# Patient Record
Sex: FEMALE | Race: WHITE | HISPANIC OR LATINO
[De-identification: names, ages, dates, MRNs, and addresses within clinical notes are randomized per-mention and may not be internally consistent; named-entity substitution may affect disease eponyms.]

---

## 2020-09-02 PROBLEM — Z00.00 ENCOUNTER FOR PREVENTIVE HEALTH EXAMINATION: Status: ACTIVE | Noted: 2020-09-02

## 2020-09-24 ENCOUNTER — APPOINTMENT (OUTPATIENT)
Dept: NEPHROLOGY | Facility: CLINIC | Age: 35
End: 2020-09-24
Payer: SELF-PAY

## 2020-09-24 VITALS
OXYGEN SATURATION: 99 % | BODY MASS INDEX: 30.48 KG/M2 | TEMPERATURE: 99.5 F | DIASTOLIC BLOOD PRESSURE: 72 MMHG | HEIGHT: 63 IN | WEIGHT: 172 LBS | HEART RATE: 70 BPM | SYSTOLIC BLOOD PRESSURE: 107 MMHG

## 2020-09-24 DIAGNOSIS — K29.70 GASTRITIS, UNSPECIFIED, W/OUT BLEEDING: ICD-10-CM

## 2020-09-24 DIAGNOSIS — Z87.42 PERSONAL HISTORY OF OTHER DISEASES OF THE FEMALE GENITAL TRACT: ICD-10-CM

## 2020-09-24 DIAGNOSIS — Z78.9 OTHER SPECIFIED HEALTH STATUS: ICD-10-CM

## 2020-09-24 PROCEDURE — 36415 COLL VENOUS BLD VENIPUNCTURE: CPT

## 2020-09-24 PROCEDURE — 99205 OFFICE O/P NEW HI 60 MIN: CPT | Mod: 25

## 2020-09-24 NOTE — HISTORY OF PRESENT ILLNESS
[FreeTextEntry1] : Patient is a 35 year old female with history of gastritis; abnormal menstral bleeding; vitamin D def; here for initial evaluation of proteinuria. Pt has been on omeprazole for 4 months; gabapentin for LE neuropathy. Last month, found to have proteinuria in urine. No other complaints at this time.

## 2020-09-24 NOTE — PHYSICAL EXAM
[General Appearance - Alert] : alert [General Appearance - In No Acute Distress] : in no acute distress [General Appearance - Well Nourished] : well nourished [General Appearance - Well Developed] : well developed [Sclera] : the sclera and conjunctiva were normal [Outer Ear] : the ears and nose were normal in appearance [Neck Appearance] : the appearance of the neck was normal [Respiration, Rhythm And Depth] : normal respiratory rhythm and effort [Auscultation Breath Sounds / Voice Sounds] : lungs were clear to auscultation bilaterally [Heart Rate And Rhythm] : heart rate was normal and rhythm regular [Heart Sounds] : normal S1 and S2 [Arterial Pulses Carotid] : carotid pulses were normal with no bruits [Bowel Sounds] : normal bowel sounds [Abdomen Soft] : soft [Abdomen Tenderness] : non-tender [Cervical Lymph Nodes Enlarged Posterior Bilaterally] : posterior cervical [No CVA Tenderness] : no ~M costovertebral angle tenderness [Abnormal Walk] : normal gait [Skin Color & Pigmentation] : normal skin color and pigmentation [Skin Turgor] : normal skin turgor [] : no rash [Cranial Nerves] : cranial nerves 2-12 were intact [Oriented To Time, Place, And Person] : oriented to person, place, and time [Impaired Insight] : insight and judgment were intact [Affect] : the affect was normal

## 2020-09-24 NOTE — REVIEW OF SYSTEMS
[Fever] : no fever [Chills] : no chills [Eye Pain] : no eye pain [Red Eyes] : eyes not red [Earache] : no earache [Loss Of Hearing] : no hearing loss [Heart Rate Is Slow] : the heart rate was not slow [Heart Rate Is Fast] : the heart rate was not fast [Abdominal Pain] : no abdominal pain [Vomiting] : no vomiting [Arthralgias] : no arthralgias [Joint Pain] : no joint pain [Skin Lesions] : no skin lesions [Skin Wound] : no skin wound [Proptosis] : no proptosis [Hot Flashes] : no hot flashes [Easy Bleeding] : no tendency for easy bleeding [Easy Bruising] : no tendency for easy bruising [Negative] : Heme/Lymph

## 2020-09-24 NOTE — ASSESSMENT
[FreeTextEntry1] : 1) Proteinuria\par 2) Gastritis\par 3) Neuropathy\par 4) Vitamin D Def\par \par Will stop omeprazole;\par Start famotidine;\par CHeck baseline labs\par CBC, renal panel, UA, urine pro/cr ratio;\par Serum free light chains\par Immunofixation\par US renal\par \par FU 1 mo and repeat spot ratio OFF omeprazole

## 2020-09-29 LAB
ALBUMIN SERPL ELPH-MCNC: 4.3 G/DL
ANION GAP SERPL CALC-SCNC: 11 MMOL/L
APPEARANCE: CLEAR
BACTERIA: ABNORMAL
BASOPHILS # BLD AUTO: 0.07 K/UL
BASOPHILS NFR BLD AUTO: 1.3 %
BILIRUBIN URINE: NEGATIVE
BLOOD URINE: ABNORMAL
BUN SERPL-MCNC: 16 MG/DL
CALCIUM OXALATE CRYSTALS: ABNORMAL
CALCIUM SERPL-MCNC: 9.4 MG/DL
CHLORIDE SERPL-SCNC: 104 MMOL/L
CO2 SERPL-SCNC: 26 MMOL/L
COLOR: NORMAL
CREAT SERPL-MCNC: 0.89 MG/DL
CREAT SPEC-SCNC: 166 MG/DL
CREAT/PROT UR: 0 RATIO
DEPRECATED KAPPA LC FREE/LAMBDA SER: 1.07 RATIO
EOSINOPHIL # BLD AUTO: 0.18 K/UL
EOSINOPHIL NFR BLD AUTO: 3.3 %
ESTIMATED AVERAGE GLUCOSE: 117 MG/DL
GLUCOSE QUALITATIVE U: NEGATIVE
GLUCOSE SERPL-MCNC: 84 MG/DL
HBA1C MFR BLD HPLC: 5.7 %
HCT VFR BLD CALC: 40.4 %
HGB BLD-MCNC: 12.7 G/DL
HYALINE CASTS: 0 /LPF
IMM GRANULOCYTES NFR BLD AUTO: 0.2 %
KAPPA LC CSF-MCNC: 2.11 MG/DL
KAPPA LC SERPL-MCNC: 2.25 MG/DL
KETONES URINE: NEGATIVE
LEUKOCYTE ESTERASE URINE: NEGATIVE
LYMPHOCYTES # BLD AUTO: 2.37 K/UL
LYMPHOCYTES NFR BLD AUTO: 43 %
M PROTEIN SPEC IFE-MCNC: NORMAL
MAN DIFF?: NORMAL
MCHC RBC-ENTMCNC: 30 PG
MCHC RBC-ENTMCNC: 31.4 GM/DL
MCV RBC AUTO: 95.5 FL
MICROSCOPIC-UA: NORMAL
MONOCYTES # BLD AUTO: 0.57 K/UL
MONOCYTES NFR BLD AUTO: 10.3 %
NEUTROPHILS # BLD AUTO: 2.31 K/UL
NEUTROPHILS NFR BLD AUTO: 41.9 %
NITRITE URINE: NEGATIVE
PH URINE: 6
PHOSPHATE SERPL-MCNC: 3.4 MG/DL
PLATELET # BLD AUTO: 190 K/UL
POTASSIUM SERPL-SCNC: 4.9 MMOL/L
PROT UR-MCNC: 6 MG/DL
PROTEIN URINE: NORMAL
RBC # BLD: 4.23 M/UL
RBC # FLD: 13.8 %
RED BLOOD CELLS URINE: 2 /HPF
SODIUM SERPL-SCNC: 140 MMOL/L
SPECIFIC GRAVITY URINE: 1.03
SQUAMOUS EPITHELIAL CELLS: 6 /HPF
UROBILINOGEN URINE: NORMAL
WBC # FLD AUTO: 5.51 K/UL
WHITE BLOOD CELLS URINE: 2 /HPF

## 2020-10-27 ENCOUNTER — OUTPATIENT (OUTPATIENT)
Dept: OUTPATIENT SERVICES | Facility: HOSPITAL | Age: 35
LOS: 1 days | End: 2020-10-27
Payer: SELF-PAY

## 2020-10-27 ENCOUNTER — APPOINTMENT (OUTPATIENT)
Dept: ULTRASOUND IMAGING | Facility: CLINIC | Age: 35
End: 2020-10-27
Payer: SELF-PAY

## 2020-10-27 DIAGNOSIS — K29.70 GASTRITIS, UNSPECIFIED, WITHOUT BLEEDING: ICD-10-CM

## 2020-10-27 PROCEDURE — 76775 US EXAM ABDO BACK WALL LIM: CPT | Mod: 26

## 2020-10-27 PROCEDURE — 76770 US EXAM ABDO BACK WALL COMP: CPT | Mod: 26

## 2020-10-27 PROCEDURE — 76770 US EXAM ABDO BACK WALL COMP: CPT

## 2020-10-27 PROCEDURE — 76775 US EXAM ABDO BACK WALL LIM: CPT

## 2020-10-29 ENCOUNTER — APPOINTMENT (OUTPATIENT)
Dept: NEPHROLOGY | Facility: CLINIC | Age: 35
End: 2020-10-29

## 2020-11-19 ENCOUNTER — APPOINTMENT (OUTPATIENT)
Dept: NEPHROLOGY | Facility: CLINIC | Age: 35
End: 2020-11-19
Payer: SELF-PAY

## 2020-11-19 VITALS
HEART RATE: 67 BPM | SYSTOLIC BLOOD PRESSURE: 109 MMHG | DIASTOLIC BLOOD PRESSURE: 75 MMHG | WEIGHT: 172 LBS | BODY MASS INDEX: 30.48 KG/M2 | TEMPERATURE: 98.4 F | HEIGHT: 63 IN | OXYGEN SATURATION: 99 %

## 2020-11-19 PROCEDURE — 99215 OFFICE O/P EST HI 40 MIN: CPT

## 2020-11-19 NOTE — HISTORY OF PRESENT ILLNESS
[FreeTextEntry1] : Patient is a 35 year old female with history of gastritis; abnormal menstral bleeding; vitamin D def; here for initial evaluation of proteinuria. Pt has been on omeprazole for 4 months; gabapentin for LE neuropathy. Last month, found to have proteinuria in urine. No other complaints at this time.\par \par Current: Pt seen/examined; no complaints; urine pro/cr ratio shows no proteinuria; UA showed trace.

## 2020-11-19 NOTE — ASSESSMENT
[FreeTextEntry1] : 1) Proteinuria\par 2) Gastritis\par 3) Neuropathy\par 4) Vitamin D Def\par \par Off omeprazole; now on famotidine\par UA showed trace proteinuria\par Urine pro/cr ratio showed zero\par Immunofixation negative\par RTC 1 year

## 2020-12-16 ENCOUNTER — ASOB RESULT (OUTPATIENT)
Age: 35
End: 2020-12-16

## 2020-12-16 ENCOUNTER — APPOINTMENT (OUTPATIENT)
Dept: ANTEPARTUM | Facility: CLINIC | Age: 35
End: 2020-12-16
Payer: SELF-PAY

## 2020-12-16 PROCEDURE — 76856 US EXAM PELVIC COMPLETE: CPT | Mod: 59

## 2020-12-16 PROCEDURE — 76830 TRANSVAGINAL US NON-OB: CPT | Mod: 59

## 2021-05-11 ENCOUNTER — APPOINTMENT (OUTPATIENT)
Dept: MATERNAL FETAL MEDICINE | Facility: CLINIC | Age: 36
End: 2021-05-11
Payer: MEDICAID

## 2021-05-11 ENCOUNTER — ASOB RESULT (OUTPATIENT)
Age: 36
End: 2021-05-11

## 2021-05-11 PROCEDURE — 99202 OFFICE O/P NEW SF 15 MIN: CPT | Mod: 95

## 2021-05-20 ENCOUNTER — APPOINTMENT (OUTPATIENT)
Dept: MATERNAL FETAL MEDICINE | Facility: CLINIC | Age: 36
End: 2021-05-20

## 2021-05-20 ENCOUNTER — APPOINTMENT (OUTPATIENT)
Dept: ANTEPARTUM | Facility: CLINIC | Age: 36
End: 2021-05-20
Payer: MEDICAID

## 2021-05-20 ENCOUNTER — APPOINTMENT (OUTPATIENT)
Dept: MATERNAL FETAL MEDICINE | Facility: CLINIC | Age: 36
End: 2021-05-20
Payer: MEDICAID

## 2021-05-20 ENCOUNTER — ASOB RESULT (OUTPATIENT)
Age: 36
End: 2021-05-20

## 2021-05-20 PROCEDURE — 76801 OB US < 14 WKS SINGLE FETUS: CPT

## 2021-05-20 PROCEDURE — 99204 OFFICE O/P NEW MOD 45 MIN: CPT

## 2021-06-03 ENCOUNTER — TRANSCRIPTION ENCOUNTER (OUTPATIENT)
Age: 36
End: 2021-06-03

## 2021-06-14 ENCOUNTER — APPOINTMENT (OUTPATIENT)
Dept: ANTEPARTUM | Facility: CLINIC | Age: 36
End: 2021-06-14
Payer: MEDICAID

## 2021-06-14 ENCOUNTER — ASOB RESULT (OUTPATIENT)
Age: 36
End: 2021-06-14

## 2021-06-14 PROCEDURE — 76813 OB US NUCHAL MEAS 1 GEST: CPT

## 2021-06-17 LAB
1ST TRIMESTER DATA: NORMAL
ADDENDUM DOC: NORMAL
AFP PNL SERPL: NORMAL
AFP SERPL-ACNC: NORMAL
CLINICAL BIOCHEMIST REVIEW: NORMAL
FREE BETA HCG 1ST TRIMESTER: NORMAL
Lab: NORMAL
NASAL BONE: PRESENT
NOTES NTD: NORMAL
NT: NORMAL
PAPP-A SERPL-ACNC: NORMAL
TRISOMY 18/3: NORMAL

## 2021-07-19 ENCOUNTER — APPOINTMENT (OUTPATIENT)
Dept: ANTEPARTUM | Facility: CLINIC | Age: 36
End: 2021-07-19
Payer: MEDICAID

## 2021-07-19 VITALS — WEIGHT: 178 LBS | BODY MASS INDEX: 31.53 KG/M2

## 2021-07-19 PROCEDURE — 36415 COLL VENOUS BLD VENIPUNCTURE: CPT

## 2021-08-16 ENCOUNTER — APPOINTMENT (OUTPATIENT)
Dept: ANTEPARTUM | Facility: CLINIC | Age: 36
End: 2021-08-16
Payer: MEDICAID

## 2021-08-16 ENCOUNTER — ASOB RESULT (OUTPATIENT)
Age: 36
End: 2021-08-16

## 2021-08-16 DIAGNOSIS — Z78.9 OTHER SPECIFIED HEALTH STATUS: ICD-10-CM

## 2021-08-16 PROCEDURE — 76811 OB US DETAILED SNGL FETUS: CPT

## 2021-08-16 PROCEDURE — 76817 TRANSVAGINAL US OBSTETRIC: CPT

## 2021-09-08 LAB
1ST TRIMESTER DATA: NORMAL
2ND TRIMESTER DATA: NORMAL
AFP PNL SERPL: NORMAL
AFP SERPL-ACNC: NORMAL
AFP SERPL-ACNC: NORMAL
B-HCG FREE SERPL-MCNC: NORMAL
CLINICAL BIOCHEMIST REVIEW: NORMAL
FREE BETA HCG 1ST TRIMESTER: NORMAL
INHIBIN A SERPL-MCNC: NORMAL
NASAL BONE: PRESENT
NOTES NTD: NORMAL
NT: NORMAL
PAPP-A SERPL-ACNC: NORMAL
U ESTRIOL SERPL-SCNC: NORMAL

## 2021-09-10 ENCOUNTER — APPOINTMENT (OUTPATIENT)
Dept: PEDIATRIC CARDIOLOGY | Facility: CLINIC | Age: 36
End: 2021-09-10
Payer: MEDICAID

## 2021-09-10 DIAGNOSIS — O09.812 SUPERVISION OF PREGNANCY RESULTING FROM ASSISTED REPRODUCTIVE TECHNOLOGY, SECOND TRIMESTER: ICD-10-CM

## 2021-09-10 DIAGNOSIS — Z3A.24 24 WEEKS GESTATION OF PREGNANCY: ICD-10-CM

## 2021-09-10 DIAGNOSIS — O09.522 SUPERVISION OF ELDERLY MULTIGRAVIDA, SECOND TRIMESTER: ICD-10-CM

## 2021-09-10 DIAGNOSIS — O35.9XX0 MATERNAL CARE FOR (SUSPECTED) FETAL ABNORMALITY AND DAMAGE, UNSPECIFIED, NOT APPLICABLE OR UNSPECIFIED: ICD-10-CM

## 2021-09-10 PROCEDURE — 76825 ECHO EXAM OF FETAL HEART: CPT

## 2021-09-10 PROCEDURE — 76820 UMBILICAL ARTERY ECHO: CPT

## 2021-09-10 PROCEDURE — 99204 OFFICE O/P NEW MOD 45 MIN: CPT

## 2021-09-10 PROCEDURE — 93325 DOPPLER ECHO COLOR FLOW MAPG: CPT | Mod: 59

## 2021-09-10 PROCEDURE — 76821 MIDDLE CEREBRAL ARTERY ECHO: CPT

## 2021-09-10 PROCEDURE — 76827 ECHO EXAM OF FETAL HEART: CPT

## 2021-10-05 ENCOUNTER — EMERGENCY (EMERGENCY)
Facility: HOSPITAL | Age: 36
LOS: 1 days | Discharge: HOME | End: 2021-10-05
Attending: EMERGENCY MEDICINE
Payer: COMMERCIAL

## 2021-10-05 VITALS
DIASTOLIC BLOOD PRESSURE: 77 MMHG | HEART RATE: 99 BPM | RESPIRATION RATE: 19 BRPM | OXYGEN SATURATION: 98 % | SYSTOLIC BLOOD PRESSURE: 125 MMHG | TEMPERATURE: 98 F | WEIGHT: 182.1 LBS

## 2021-10-05 LAB
RAPID RVP RESULT: SIGNIFICANT CHANGE UP
SARS-COV-2 RNA SPEC QL NAA+PROBE: SIGNIFICANT CHANGE UP

## 2021-10-05 PROCEDURE — 99283 EMERGENCY DEPT VISIT LOW MDM: CPT

## 2021-10-05 PROCEDURE — 0225U NFCT DS DNA&RNA 21 SARSCOV2: CPT

## 2021-10-05 PROCEDURE — 99284 EMERGENCY DEPT VISIT MOD MDM: CPT

## 2021-10-05 RX ORDER — ACETAMINOPHEN 500 MG
650 TABLET ORAL ONCE
Refills: 0 | Status: COMPLETED | OUTPATIENT
Start: 2021-10-05 | End: 2021-10-05

## 2021-10-05 RX ADMIN — Medication 650 MILLIGRAM(S): at 05:36

## 2021-10-05 NOTE — ED ADULT TRIAGE NOTE - CHIEF COMPLAINT QUOTE
Patient 28 weeks pregnant came in with complain of dry cough started few days ago. Patient denies vaginal bleeding fever chills and congestion.

## 2021-10-05 NOTE — CHART NOTE - NSCHARTNOTEFT_GEN_A_CORE
Pt presents to ED for evaluation of a cough. Scheduled for covid vaccine on 10/15.  No obstetrical complaints. + fetal movement, - loss of fluid, - vaginal bleeding, - ctx.     NST currently being performed. Will review after 20 minutes.

## 2021-10-05 NOTE — ED PROVIDER NOTE - PATIENT PORTAL LINK FT
You can access the FollowMyHealth Patient Portal offered by [facility] by registering at the following website: [email or web address]. By joining [employer]’s FollowMyHealth portal, you will also be able to view your health information using other applications (apps) compatible with our system.

## 2021-10-05 NOTE — ED PROVIDER NOTE - OBJECTIVE STATEMENT
pt is a 37 y/o female presenting to the ed for evaluation of cough. pt admits to dry cough the past two days, states that it has been persistent. pt is 28 weeks pregnant , pt with no complications in pregnancy, pt denies vaginal bleeding or abdominal pain. pt denies cp sob sore throat ear pain fever abd pain diarrhea back pain calf pain or leg swelling

## 2021-10-05 NOTE — ED PROVIDER NOTE - ATTENDING CONTRIBUTION TO CARE
Patient is 28 weeks pregnant p/w dry cough x 2 days. Denies fever, sob, cp. VSS, well appearing. Plan for tylenol, RVP/covid swab, ob consult for NST.

## 2021-10-11 ENCOUNTER — APPOINTMENT (OUTPATIENT)
Dept: ANTEPARTUM | Facility: CLINIC | Age: 36
End: 2021-10-11

## 2021-10-21 ENCOUNTER — ASOB RESULT (OUTPATIENT)
Age: 36
End: 2021-10-21

## 2021-10-21 ENCOUNTER — APPOINTMENT (OUTPATIENT)
Dept: ANTEPARTUM | Facility: CLINIC | Age: 36
End: 2021-10-21
Payer: MEDICAID

## 2021-10-21 PROCEDURE — 76816 OB US FOLLOW-UP PER FETUS: CPT

## 2021-11-18 ENCOUNTER — APPOINTMENT (OUTPATIENT)
Dept: ANTEPARTUM | Facility: CLINIC | Age: 36
End: 2021-11-18
Payer: MEDICAID

## 2021-11-18 ENCOUNTER — ASOB RESULT (OUTPATIENT)
Age: 36
End: 2021-11-18

## 2021-11-18 PROCEDURE — 76816 OB US FOLLOW-UP PER FETUS: CPT

## 2021-12-02 ENCOUNTER — APPOINTMENT (OUTPATIENT)
Dept: NEPHROLOGY | Facility: CLINIC | Age: 36
End: 2021-12-02

## 2021-12-12 ENCOUNTER — INPATIENT (INPATIENT)
Facility: HOSPITAL | Age: 36
LOS: 3 days | Discharge: HOME | End: 2021-12-16
Attending: OBSTETRICS & GYNECOLOGY | Admitting: OBSTETRICS & GYNECOLOGY
Payer: COMMERCIAL

## 2021-12-12 VITALS — DIASTOLIC BLOOD PRESSURE: 70 MMHG | SYSTOLIC BLOOD PRESSURE: 144 MMHG | RESPIRATION RATE: 18 BRPM | HEART RATE: 54 BPM

## 2021-12-12 DIAGNOSIS — O47.1 FALSE LABOR AT OR AFTER 37 COMPLETED WEEKS OF GESTATION: ICD-10-CM

## 2021-12-12 DIAGNOSIS — O26.893 OTHER SPECIFIED PREGNANCY RELATED CONDITIONS, THIRD TRIMESTER: ICD-10-CM

## 2021-12-12 LAB
ALBUMIN SERPL ELPH-MCNC: 3.2 G/DL — LOW (ref 3.3–5.2)
ALP SERPL-CCNC: 237 U/L — HIGH (ref 40–120)
ALT FLD-CCNC: 59 U/L — HIGH
ANION GAP SERPL CALC-SCNC: 12 MMOL/L — SIGNIFICANT CHANGE UP (ref 5–17)
APPEARANCE UR: CLEAR — SIGNIFICANT CHANGE UP
APTT BLD: 24.1 SEC — LOW (ref 27.5–35.5)
AST SERPL-CCNC: 39 U/L — HIGH
BACTERIA # UR AUTO: ABNORMAL
BASOPHILS # BLD AUTO: 0.06 K/UL — SIGNIFICANT CHANGE UP (ref 0–0.2)
BASOPHILS NFR BLD AUTO: 0.9 % — SIGNIFICANT CHANGE UP (ref 0–2)
BILIRUB SERPL-MCNC: <0.2 MG/DL — LOW (ref 0.4–2)
BILIRUB UR-MCNC: NEGATIVE — SIGNIFICANT CHANGE UP
BLD GP AB SCN SERPL QL: SIGNIFICANT CHANGE UP
BUN SERPL-MCNC: 19.2 MG/DL — SIGNIFICANT CHANGE UP (ref 8–20)
CALCIUM SERPL-MCNC: 8.6 MG/DL — SIGNIFICANT CHANGE UP (ref 8.6–10.2)
CHLORIDE SERPL-SCNC: 107 MMOL/L — SIGNIFICANT CHANGE UP (ref 98–107)
CO2 SERPL-SCNC: 19 MMOL/L — LOW (ref 22–29)
COLOR SPEC: YELLOW — SIGNIFICANT CHANGE UP
CREAT ?TM UR-MCNC: 116 MG/DL — SIGNIFICANT CHANGE UP
CREAT SERPL-MCNC: 0.89 MG/DL — SIGNIFICANT CHANGE UP (ref 0.5–1.3)
DIFF PNL FLD: ABNORMAL
EOSINOPHIL # BLD AUTO: 0.05 K/UL — SIGNIFICANT CHANGE UP (ref 0–0.5)
EOSINOPHIL NFR BLD AUTO: 0.7 % — SIGNIFICANT CHANGE UP (ref 0–6)
EPI CELLS # UR: ABNORMAL
FIBRINOGEN PPP-MCNC: 596 MG/DL — HIGH (ref 290–520)
GLUCOSE SERPL-MCNC: 89 MG/DL — SIGNIFICANT CHANGE UP (ref 70–99)
GLUCOSE UR QL: NEGATIVE MG/DL — SIGNIFICANT CHANGE UP
HCT VFR BLD CALC: 34.1 % — LOW (ref 34.5–45)
HGB BLD-MCNC: 11.5 G/DL — SIGNIFICANT CHANGE UP (ref 11.5–15.5)
IMM GRANULOCYTES NFR BLD AUTO: 0.1 % — SIGNIFICANT CHANGE UP (ref 0–1.5)
INR BLD: 0.86 RATIO — LOW (ref 0.88–1.16)
KETONES UR-MCNC: NEGATIVE — SIGNIFICANT CHANGE UP
LDH SERPL L TO P-CCNC: 228 U/L — HIGH (ref 98–192)
LEUKOCYTE ESTERASE UR-ACNC: ABNORMAL
LYMPHOCYTES # BLD AUTO: 2.37 K/UL — SIGNIFICANT CHANGE UP (ref 1–3.3)
LYMPHOCYTES # BLD AUTO: 35.2 % — SIGNIFICANT CHANGE UP (ref 13–44)
MCHC RBC-ENTMCNC: 30.5 PG — SIGNIFICANT CHANGE UP (ref 27–34)
MCHC RBC-ENTMCNC: 33.7 GM/DL — SIGNIFICANT CHANGE UP (ref 32–36)
MCV RBC AUTO: 90.5 FL — SIGNIFICANT CHANGE UP (ref 80–100)
MONOCYTES # BLD AUTO: 0.7 K/UL — SIGNIFICANT CHANGE UP (ref 0–0.9)
MONOCYTES NFR BLD AUTO: 10.4 % — SIGNIFICANT CHANGE UP (ref 2–14)
NEUTROPHILS # BLD AUTO: 3.55 K/UL — SIGNIFICANT CHANGE UP (ref 1.8–7.4)
NEUTROPHILS NFR BLD AUTO: 52.7 % — SIGNIFICANT CHANGE UP (ref 43–77)
NITRITE UR-MCNC: NEGATIVE — SIGNIFICANT CHANGE UP
PH UR: 6 — SIGNIFICANT CHANGE UP (ref 5–8)
PLATELET # BLD AUTO: 118 K/UL — LOW (ref 150–400)
POTASSIUM SERPL-MCNC: 4.2 MMOL/L — SIGNIFICANT CHANGE UP (ref 3.5–5.3)
POTASSIUM SERPL-SCNC: 4.2 MMOL/L — SIGNIFICANT CHANGE UP (ref 3.5–5.3)
PROT ?TM UR-MCNC: 41 MG/DL — HIGH (ref 0–12)
PROT SERPL-MCNC: 6.5 G/DL — LOW (ref 6.6–8.7)
PROT UR-MCNC: NEGATIVE — SIGNIFICANT CHANGE UP
PROT/CREAT UR-RTO: 0.4 RATIO — HIGH
PROTHROM AB SERPL-ACNC: 10.1 SEC — LOW (ref 10.6–13.6)
RBC # BLD: 3.77 M/UL — LOW (ref 3.8–5.2)
RBC # FLD: 14.5 % — SIGNIFICANT CHANGE UP (ref 10.3–14.5)
RBC CASTS # UR COMP ASSIST: SIGNIFICANT CHANGE UP /HPF (ref 0–4)
SODIUM SERPL-SCNC: 138 MMOL/L — SIGNIFICANT CHANGE UP (ref 135–145)
SP GR SPEC: 1.01 — SIGNIFICANT CHANGE UP (ref 1.01–1.02)
URATE SERPL-MCNC: 7.6 MG/DL — HIGH (ref 2.4–5.7)
UROBILINOGEN FLD QL: NEGATIVE MG/DL — SIGNIFICANT CHANGE UP
WBC # BLD: 6.74 K/UL — SIGNIFICANT CHANGE UP (ref 3.8–10.5)
WBC # FLD AUTO: 6.74 K/UL — SIGNIFICANT CHANGE UP (ref 3.8–10.5)
WBC UR QL: SIGNIFICANT CHANGE UP

## 2021-12-12 RX ORDER — OXYTOCIN 10 UNIT/ML
333.33 VIAL (ML) INJECTION
Qty: 20 | Refills: 0 | Status: DISCONTINUED | OUTPATIENT
Start: 2021-12-12 | End: 2021-12-16

## 2021-12-12 RX ORDER — MAGNESIUM SULFATE 500 MG/ML
1 VIAL (ML) INJECTION
Qty: 40 | Refills: 0 | Status: COMPLETED | OUTPATIENT
Start: 2021-12-12 | End: 2021-12-13

## 2021-12-12 RX ORDER — SODIUM CHLORIDE 9 MG/ML
1000 INJECTION, SOLUTION INTRAVENOUS
Refills: 0 | Status: DISCONTINUED | OUTPATIENT
Start: 2021-12-12 | End: 2021-12-14

## 2021-12-12 RX ORDER — MAGNESIUM SULFATE 500 MG/ML
4 VIAL (ML) INJECTION ONCE
Refills: 0 | Status: COMPLETED | OUTPATIENT
Start: 2021-12-12 | End: 2021-12-12

## 2021-12-12 RX ORDER — CITRIC ACID/SODIUM CITRATE 300-500 MG
30 SOLUTION, ORAL ORAL ONCE
Refills: 0 | Status: DISCONTINUED | OUTPATIENT
Start: 2021-12-12 | End: 2021-12-14

## 2021-12-12 RX ORDER — AMPICILLIN TRIHYDRATE 250 MG
2 CAPSULE ORAL ONCE
Refills: 0 | Status: COMPLETED | OUTPATIENT
Start: 2021-12-12 | End: 2021-12-12

## 2021-12-12 RX ORDER — AMPICILLIN TRIHYDRATE 250 MG
1 CAPSULE ORAL EVERY 4 HOURS
Refills: 0 | Status: DISCONTINUED | OUTPATIENT
Start: 2021-12-12 | End: 2021-12-13

## 2021-12-12 RX ORDER — OXYTOCIN 10 UNIT/ML
2 VIAL (ML) INJECTION
Qty: 30 | Refills: 0 | Status: DISCONTINUED | OUTPATIENT
Start: 2021-12-12 | End: 2021-12-13

## 2021-12-12 RX ORDER — LABETALOL HCL 100 MG
20 TABLET ORAL ONCE
Refills: 0 | Status: COMPLETED | OUTPATIENT
Start: 2021-12-12 | End: 2021-12-12

## 2021-12-12 RX ADMIN — Medication 216 GRAM(S): at 23:26

## 2021-12-12 RX ADMIN — Medication 50 GM/HR: at 23:21

## 2021-12-12 RX ADMIN — Medication 20 MILLIGRAM(S): at 22:04

## 2021-12-12 RX ADMIN — Medication 300 GRAM(S): at 22:10

## 2021-12-12 RX ADMIN — SODIUM CHLORIDE 125 MILLILITER(S): 9 INJECTION, SOLUTION INTRAVENOUS at 22:40

## 2021-12-12 NOTE — OB PROVIDER H&P - ASSESSMENT
35yo  at 37w4d admitted for preeclampsia with severe features at early term delivery for induction of labor  Cat 1 tracing, GBS positive on Amp, Platelets downtrending to 118 and uptrending LFT, not yet twice the upper limit.  -routine labs, Hellp labs q 12h  -IOL with Pitocin + Cook cervical balloon  -IV fluids total @ 125 (LR at 74 + Magnesium Sulphate for seizure prophylaxis @50)    -discussed with Dr. Munoz

## 2021-12-12 NOTE — OB RN PATIENT PROFILE - NSICDXPASTMEDICALHX_GEN_ALL_CORE_FT
PAST MEDICAL HISTORY:  No pertinent past medical history PAST MEDICAL HISTORY:  H/O tubal ligation 2011     product of in vitro fertilization (IVF) pregnancy

## 2021-12-12 NOTE — OB RN PATIENT PROFILE - FALL HARM RISK - UNIVERSAL INTERVENTIONS
Bed in lowest position, wheels locked, appropriate side rails in place/Call bell, personal items and telephone in reach/Instruct patient to call for assistance before getting out of bed or chair/Non-slip footwear when patient is out of bed/[place] to call system/Physically safe environment - no spills, clutter or unnecessary equipment/Purposeful Proactive Rounding/Room/bathroom lighting operational, light cord in reach

## 2021-12-12 NOTE — OB PROVIDER H&P - HISTORY OF PRESENT ILLNESS
37yo  at 37w4d presented to L&D triage with vague abdominal cramping. and on and off contractions pains.  Denies: vaginal bleeding, vaginal d/c, loss of fluid. Denies blurry vision, headaches. reports good fetal movement.    Patient is status post postpartum permanent sterilization, this pregnancy is achieved via IVF.  Normal fetal echocardiogram, low risk NIPT.    Pregnancy otherwise significant for   Class I obesity  AMA  RH negative blood, status post RhoGam @28 weeks  fibroid uterus    PMH: none  PSH: Postpartum permanent sterilization in   OBH: regular cycles, no history of STI  Alllergy: NKDA  meds: PNV  Preg complications: none    HR: 68 (21 @ 22:56) (51 - 70)  BP: 118/73 (21 @ 22:56) (118/73 - 173/81)  RR: 18 (21 @ 20:33) (18 - 18)  Gen: NAD  Pulm: CTABL  CVR: RRR nl S1 S2  Abd: softly distended, gravid, + BS   Pelvic:  0cm/50 % effaced/-2 station   Ultrasound: vertex, anterior placenta  Tracinbpm, moderate, + accelerations, - decelerations

## 2021-12-12 NOTE — OB RN PATIENT PROFILE - FUNCTIONAL ASSESSMENT - DAILY ACTIVITY 1.
Tracking was updated.  No lab order necessary. Anticipated INR recheck date via [facility]: 11/26/21.  INR results will be called/faxed in.  INR: 1.64 on 11/22/21  Last V visit with Dr. Ralph on 8/26/21       4 = No assist / stand by assistance

## 2021-12-12 NOTE — OB PROVIDER H&P - NSHPLABSRESULTS_GEN_ALL_CORE
11.5   6.74  )-----------( 118      ( 12 Dec 2021 22:15 )             34.1     12-12    138  |  107  |  19.2  ----------------------------<  89  4.2   |  19.0<L>  |  0.89    Ca    8.6      12 Dec 2021 22:15    TPro  6.5<L>  /  Alb  3.2<L>  /  TBili  <0.2<L>  /  DBili  x   /  AST  39<H>  /  ALT  59<H>  /  AlkPhos  237<H>  12-12    Protein/Creatinine Ratio Calculation: 0.4 Ratio (12.12.21 @ 22:16) SARS-CoV-2: NotDetec: This Respiratory Panel uses polymerase chain reaction (PCR) to detect for   adenovirus; coronavirus (HKU1, NL63, 229E, OC43); human metapneumovirus   (hMPV); human enterovirus/rhinovirus (Entero/RV); influenza A; influenza   A/H1; influenza A/H3; influenza A/H1-2009; influenza B; parainfluenza   viruses 1, 2, 3, 4; respiratory syncytial virus; Mycoplasma pneumoniae;   Chlamydophila pneumoniae; and SARS-CoV-2. (10.05.21 @ 08:16)

## 2021-12-13 ENCOUNTER — RESULT REVIEW (OUTPATIENT)
Age: 36
End: 2021-12-13

## 2021-12-13 ENCOUNTER — TRANSCRIPTION ENCOUNTER (OUTPATIENT)
Age: 36
End: 2021-12-13

## 2021-12-13 LAB
ABO RH CONFIRMATION: SIGNIFICANT CHANGE UP
ALBUMIN SERPL ELPH-MCNC: 3.3 G/DL — SIGNIFICANT CHANGE UP (ref 3.3–5.2)
ALP SERPL-CCNC: 240 U/L — HIGH (ref 40–120)
ALT FLD-CCNC: 58 U/L — HIGH
ANION GAP SERPL CALC-SCNC: 13 MMOL/L — SIGNIFICANT CHANGE UP (ref 5–17)
AST SERPL-CCNC: 37 U/L — HIGH
BILIRUB SERPL-MCNC: 0.2 MG/DL — LOW (ref 0.4–2)
BUN SERPL-MCNC: 14.9 MG/DL — SIGNIFICANT CHANGE UP (ref 8–20)
CALCIUM SERPL-MCNC: 8.2 MG/DL — LOW (ref 8.6–10.2)
CHLORIDE SERPL-SCNC: 105 MMOL/L — SIGNIFICANT CHANGE UP (ref 98–107)
CO2 SERPL-SCNC: 19 MMOL/L — LOW (ref 22–29)
CREAT SERPL-MCNC: 0.91 MG/DL — SIGNIFICANT CHANGE UP (ref 0.5–1.3)
GLUCOSE SERPL-MCNC: 104 MG/DL — HIGH (ref 70–99)
HCT VFR BLD CALC: 33.3 % — LOW (ref 34.5–45)
HGB BLD-MCNC: 11.2 G/DL — LOW (ref 11.5–15.5)
MAGNESIUM SERPL-MCNC: 4.9 MG/DL — HIGH (ref 1.6–2.6)
MAGNESIUM SERPL-MCNC: 5.3 MG/DL — HIGH (ref 1.6–2.6)
MAGNESIUM SERPL-MCNC: 5.6 MG/DL — HIGH (ref 1.6–2.6)
MCHC RBC-ENTMCNC: 30.3 PG — SIGNIFICANT CHANGE UP (ref 27–34)
MCHC RBC-ENTMCNC: 33.6 GM/DL — SIGNIFICANT CHANGE UP (ref 32–36)
MCV RBC AUTO: 90 FL — SIGNIFICANT CHANGE UP (ref 80–100)
MEV IGG SER-ACNC: 108 AU/ML — SIGNIFICANT CHANGE UP
MEV IGG+IGM SER-IMP: POSITIVE — SIGNIFICANT CHANGE UP
PLATELET # BLD AUTO: 117 K/UL — LOW (ref 150–400)
POTASSIUM SERPL-MCNC: 4.4 MMOL/L — SIGNIFICANT CHANGE UP (ref 3.5–5.3)
POTASSIUM SERPL-SCNC: 4.4 MMOL/L — SIGNIFICANT CHANGE UP (ref 3.5–5.3)
PROT SERPL-MCNC: 6.6 G/DL — SIGNIFICANT CHANGE UP (ref 6.6–8.7)
RBC # BLD: 3.7 M/UL — LOW (ref 3.8–5.2)
RBC # FLD: 14.6 % — HIGH (ref 10.3–14.5)
SARS-COV-2 RNA SPEC QL NAA+PROBE: SIGNIFICANT CHANGE UP
SODIUM SERPL-SCNC: 137 MMOL/L — SIGNIFICANT CHANGE UP (ref 135–145)
T PALLIDUM AB TITR SER: NEGATIVE — SIGNIFICANT CHANGE UP
WBC # BLD: 8.77 K/UL — SIGNIFICANT CHANGE UP (ref 3.8–10.5)
WBC # FLD AUTO: 8.77 K/UL — SIGNIFICANT CHANGE UP (ref 3.8–10.5)

## 2021-12-13 PROCEDURE — 88307 TISSUE EXAM BY PATHOLOGIST: CPT | Mod: 26

## 2021-12-13 RX ORDER — SODIUM CHLORIDE 9 MG/ML
1000 INJECTION, SOLUTION INTRAVENOUS
Refills: 0 | Status: DISCONTINUED | OUTPATIENT
Start: 2021-12-13 | End: 2021-12-16

## 2021-12-13 RX ORDER — TETANUS TOXOID, REDUCED DIPHTHERIA TOXOID AND ACELLULAR PERTUSSIS VACCINE, ADSORBED 5; 2.5; 8; 8; 2.5 [IU]/.5ML; [IU]/.5ML; UG/.5ML; UG/.5ML; UG/.5ML
0.5 SUSPENSION INTRAMUSCULAR ONCE
Refills: 0 | Status: DISCONTINUED | OUTPATIENT
Start: 2021-12-13 | End: 2021-12-16

## 2021-12-13 RX ORDER — BENZOCAINE 10 %
1 GEL (GRAM) MUCOUS MEMBRANE EVERY 6 HOURS
Refills: 0 | Status: DISCONTINUED | OUTPATIENT
Start: 2021-12-13 | End: 2021-12-16

## 2021-12-13 RX ORDER — HYDROCORTISONE 1 %
1 OINTMENT (GRAM) TOPICAL EVERY 6 HOURS
Refills: 0 | Status: DISCONTINUED | OUTPATIENT
Start: 2021-12-13 | End: 2021-12-16

## 2021-12-13 RX ORDER — KETOROLAC TROMETHAMINE 30 MG/ML
30 SYRINGE (ML) INJECTION ONCE
Refills: 0 | Status: DISCONTINUED | OUTPATIENT
Start: 2021-12-13 | End: 2021-12-16

## 2021-12-13 RX ORDER — SODIUM CHLORIDE 9 MG/ML
3 INJECTION INTRAMUSCULAR; INTRAVENOUS; SUBCUTANEOUS EVERY 8 HOURS
Refills: 0 | Status: DISCONTINUED | OUTPATIENT
Start: 2021-12-13 | End: 2021-12-16

## 2021-12-13 RX ORDER — OXYTOCIN 10 UNIT/ML
2 VIAL (ML) INJECTION
Qty: 30 | Refills: 0 | Status: DISCONTINUED | OUTPATIENT
Start: 2021-12-13 | End: 2021-12-16

## 2021-12-13 RX ORDER — MAGNESIUM SULFATE 500 MG/ML
1.5 VIAL (ML) INJECTION
Qty: 40 | Refills: 0 | Status: DISCONTINUED | OUTPATIENT
Start: 2021-12-13 | End: 2021-12-14

## 2021-12-13 RX ORDER — OXYCODONE HYDROCHLORIDE 5 MG/1
5 TABLET ORAL
Refills: 0 | Status: DISCONTINUED | OUTPATIENT
Start: 2021-12-13 | End: 2021-12-16

## 2021-12-13 RX ORDER — DIBUCAINE 1 %
1 OINTMENT (GRAM) RECTAL EVERY 6 HOURS
Refills: 0 | Status: DISCONTINUED | OUTPATIENT
Start: 2021-12-13 | End: 2021-12-16

## 2021-12-13 RX ORDER — DIPHENHYDRAMINE HCL 50 MG
25 CAPSULE ORAL EVERY 6 HOURS
Refills: 0 | Status: DISCONTINUED | OUTPATIENT
Start: 2021-12-13 | End: 2021-12-16

## 2021-12-13 RX ORDER — OXYCODONE HYDROCHLORIDE 5 MG/1
5 TABLET ORAL ONCE
Refills: 0 | Status: DISCONTINUED | OUTPATIENT
Start: 2021-12-13 | End: 2021-12-16

## 2021-12-13 RX ORDER — OXYTOCIN 10 UNIT/ML
333.33 VIAL (ML) INJECTION
Qty: 20 | Refills: 0 | Status: DISCONTINUED | OUTPATIENT
Start: 2021-12-13 | End: 2021-12-16

## 2021-12-13 RX ORDER — PRAMOXINE HYDROCHLORIDE 150 MG/15G
1 AEROSOL, FOAM RECTAL EVERY 4 HOURS
Refills: 0 | Status: DISCONTINUED | OUTPATIENT
Start: 2021-12-13 | End: 2021-12-16

## 2021-12-13 RX ORDER — LANOLIN
1 OINTMENT (GRAM) TOPICAL EVERY 6 HOURS
Refills: 0 | Status: DISCONTINUED | OUTPATIENT
Start: 2021-12-13 | End: 2021-12-16

## 2021-12-13 RX ORDER — IBUPROFEN 200 MG
600 TABLET ORAL EVERY 6 HOURS
Refills: 0 | Status: COMPLETED | OUTPATIENT
Start: 2021-12-13 | End: 2022-11-11

## 2021-12-13 RX ORDER — AER TRAVELER 0.5 G/1
1 SOLUTION RECTAL; TOPICAL EVERY 4 HOURS
Refills: 0 | Status: DISCONTINUED | OUTPATIENT
Start: 2021-12-13 | End: 2021-12-16

## 2021-12-13 RX ORDER — SIMETHICONE 80 MG/1
80 TABLET, CHEWABLE ORAL EVERY 4 HOURS
Refills: 0 | Status: DISCONTINUED | OUTPATIENT
Start: 2021-12-13 | End: 2021-12-16

## 2021-12-13 RX ORDER — MAGNESIUM HYDROXIDE 400 MG/1
30 TABLET, CHEWABLE ORAL
Refills: 0 | Status: DISCONTINUED | OUTPATIENT
Start: 2021-12-13 | End: 2021-12-16

## 2021-12-13 RX ORDER — ACETAMINOPHEN 500 MG
975 TABLET ORAL
Refills: 0 | Status: DISCONTINUED | OUTPATIENT
Start: 2021-12-13 | End: 2021-12-16

## 2021-12-13 RX ORDER — LABETALOL HCL 100 MG
200 TABLET ORAL EVERY 8 HOURS
Refills: 0 | Status: DISCONTINUED | OUTPATIENT
Start: 2021-12-13 | End: 2021-12-16

## 2021-12-13 RX ADMIN — Medication 1000 MILLIUNIT(S)/MIN: at 19:37

## 2021-12-13 RX ADMIN — SODIUM CHLORIDE 100 MILLILITER(S): 9 INJECTION, SOLUTION INTRAVENOUS at 10:45

## 2021-12-13 RX ADMIN — Medication 108 GRAM(S): at 03:32

## 2021-12-13 RX ADMIN — Medication 200 MILLIGRAM(S): at 14:31

## 2021-12-13 RX ADMIN — SODIUM CHLORIDE 3 MILLILITER(S): 9 INJECTION INTRAMUSCULAR; INTRAVENOUS; SUBCUTANEOUS at 23:50

## 2021-12-13 RX ADMIN — Medication 108 GRAM(S): at 15:28

## 2021-12-13 RX ADMIN — Medication 200 MILLIGRAM(S): at 06:37

## 2021-12-13 RX ADMIN — Medication 108 GRAM(S): at 11:30

## 2021-12-13 RX ADMIN — Medication 25 GM/HR: at 23:29

## 2021-12-13 RX ADMIN — Medication 975 MILLIGRAM(S): at 20:24

## 2021-12-13 RX ADMIN — Medication 2 MILLIUNIT(S)/MIN: at 00:21

## 2021-12-13 RX ADMIN — Medication 25 GM/HR: at 10:45

## 2021-12-13 RX ADMIN — Medication 2 MILLIUNIT(S)/MIN: at 17:45

## 2021-12-13 RX ADMIN — Medication 108 GRAM(S): at 07:47

## 2021-12-13 RX ADMIN — Medication 975 MILLIGRAM(S): at 21:13

## 2021-12-13 NOTE — OB PROVIDER LABOR PROGRESS NOTE - ASSESSMENT
37yo  at 37w4d IVF pregnancy status post BTL, complicated by Preeclapsia with SF on Mg So4 with cat 1 tracing, gbs positive of amp.  Cervical Cook balloon placed  Valdez placed sterilely into the blader  Started on Pitocin for augemntation 35yo  at 37w4d IVF pregnancy status post BTL, complicated by Preeclapsia with SF on Mg So4 with cat 1 tracing, gbs positive of amp.  Cervical Cook balloon placed  Valdez placed sterilely into the blader  Started on Pitocin for augemntation    attending addendum:  I have met patient and reviewed medical and obstetrical hx  IOL for pre-eclampsia with SF at 37w4d s/p Valdez balloon not in labor yet, continue pitocin  continue current mgmt  reassess in 2hrs or prn   ppalos

## 2021-12-13 NOTE — OB PROVIDER LABOR PROGRESS NOTE - ASSESSMENT
Cat 1 tracing   - VSS  - Patient on pitocin, currently at 20   - continue current management    -continuous FHR and toco  - continue to monitor      Cat 1 tracing   - VSS  - Patient on pitocin, currently at 20   - continue current management    -continuous FHR and toco  - continue to monitor     agree with above  Maternal/fetal status reassuring, not in labor yet, on Mag for pre-eclampsia with SF  continue pitocin  ppalos

## 2021-12-13 NOTE — OB PROVIDER LABOR PROGRESS NOTE - NS_OBIHIFHRDETAILS_OBGYN_ALL_OB_FT
130bpm, moderate variability, +Acels, early decels
120 moderate variability + accelerations, no decelerations
130 bpm, moderate variability, +accels, -deccel
120 bpm, moderate variability, +accels, -deccel
Cat 1

## 2021-12-13 NOTE — OB PROVIDER LABOR PROGRESS NOTE - ASSESSMENT
Pitocin at 20miU - re-ordered and specified can go up to 30miU  Patient currently intact, fetal head too high to AROM due to concern of cord prolapse  After discussing R/A/B, patient declining both IV pain medication and epidural  Continue to monitor    d/w Dr. Thompson Pitocin at 20miU - re-ordered and specified can go up to 30miU  Patient currently intact, fetal head too high to AROM due to concern of cord prolapse  After discussing R/A/B, patient declining both IV pain medication and epidural  Continue to monitor    d/w Dr. Thompson    agree with above  Maternal/fetal status reassuring, not in labor yet, on Mag for pre-eclampsia with SF  increase pitocin  of note patient SROM around 0635, clear fluid  continue current mgmt  pain mgmt discussed in detail, patient declined at this moment  ppalos

## 2021-12-13 NOTE — OB PROVIDER DELIVERY SUMMARY - NSPROVIDERDELIVERYNOTE_OBGYN_ALL_OB_FT
36y  presenting for IOL 2/2 PEC w. SF  Patient felt rectal pressure and was found to be fully dilated, 0 station. She pushed effectively for 5 minutes, and delivered a viable male infant at 1920. Vertex delivered without difficulty, no nuchal cord noted, shoulders then delivered without difficulty. Placenta delivered spontaneously and intact at 192. Pitocin started. Excellent hemostasis was achieved. Uterus, cervix, perineum and vagina were inspected and no laceration noted. Apgars 9/9, EBL 56cc.

## 2021-12-13 NOTE — OB PROVIDER LABOR PROGRESS NOTE - NS_SUBJECTIVE/OBJECTIVE_OBGYN_ALL_OB_FT
Patient comfortable.
Pt doing well. feeling more pressure
Patient feeling pressure, requesting pain medication
patient seen and examined at bedside, feeling little flushed and tired from magnesium drip.  Vital Signs Last 24 Hrs  T(C): 36.6 (12 Dec 2021 23:26), Max: 36.6 (12 Dec 2021 21:58)  T(F): 97.88 (12 Dec 2021 23:26), Max: 97.9 (12 Dec 2021 21:58)  HR: 66 (12 Dec 2021 23:57) (51 - 111)  BP: 134/76 (12 Dec 2021 23:57) (118/73 - 173/81)  RR: 18 (12 Dec 2021 23:26) (18 - 19)
Patient comfortable, not endorsing pain at the moment.

## 2021-12-13 NOTE — OB RN DELIVERY SUMMARY - NSSELHIDDEN_OBGYN_ALL_OB_FT
[NS_DeliveryAttending2_OBGYN_ALL_OB_FT:[email or web address]=],[NS_DeliveryAssist1_OBGYN_ALL_OB_FT:[id number]=],[NS_DeliveryRN_OBGYN_ALL_OB_FT:[id number]=],[NS_DeliveryAttending1_OBGYN_ALL_OB_FT:[email or web address]=]

## 2021-12-13 NOTE — CHART NOTE - NSCHARTNOTEFT_GEN_A_CORE
Patient  in labor with baloon in place. On pitocin.  Patient did not require any BP medication after one dose of labetalol IV.  Plan: continue induction.

## 2021-12-13 NOTE — OB PROVIDER DELIVERY SUMMARY - NSSELHIDDEN_OBGYN_ALL_OB_FT
[NS_DeliveryAttending1_OBGYN_ALL_OB_FT:[email or web address]=],[NS_DeliveryAssist1_OBGYN_ALL_OB_FT:[id number]=]

## 2021-12-13 NOTE — OB RN DELIVERY SUMMARY - NS_SEPSISRSKCALC_OBGYN_ALL_OB_FT
EOS calculated successfully. EOS Risk Factor: 0.5/1000 live births ([facility] national incidence); GA=37w5d; Temp=98.42; ROM=0.917; GBS='Positive'; Antibiotics='GBS specific antibiotics > 2 hrs prior to birth'

## 2021-12-13 NOTE — OB PROVIDER LABOR PROGRESS NOTE - ASSESSMENT
Cat 1 tracing   - VSS  - s/p Cook balloon, removed at 7:30 am  - Patient on Magesium for PEC w/ SF   - repeat labs ordered for AM  - continue to monitor   - continuous FHR and toco     Cat 1 tracing   - VSS  - s/p Cook balloon, removed at 7:30 am  - on pitocin currently at 10   - Patient on Magesium for PEC w/ SF   - repeat labs ordered for AM  - continue to monitor   - continuous FHR and toco    Discussed with Dr. Thompson

## 2021-12-14 LAB
ABO RH CONFIRMATION: SIGNIFICANT CHANGE UP
FETAL SCREEN: SIGNIFICANT CHANGE UP
HCT VFR BLD CALC: 30.9 % — LOW (ref 34.5–45)
HGB BLD-MCNC: 10.4 G/DL — LOW (ref 11.5–15.5)
HSV1 IGG SER-ACNC: 23.5 INDEX — HIGH
HSV1 IGG SERPL QL IA: POSITIVE
HSV2 IGG FLD-ACNC: 20.8 INDEX — HIGH
HSV2 IGG SERPL QL IA: POSITIVE
MAGNESIUM SERPL-MCNC: 4.7 MG/DL — HIGH (ref 1.6–2.6)
MAGNESIUM SERPL-MCNC: 5.9 MG/DL — HIGH (ref 1.6–2.6)
MAGNESIUM SERPL-MCNC: 6.6 MG/DL — HIGH (ref 1.6–2.6)

## 2021-12-14 RX ORDER — VALACYCLOVIR 500 MG/1
2000 TABLET, FILM COATED ORAL EVERY 12 HOURS
Refills: 0 | Status: COMPLETED | OUTPATIENT
Start: 2021-12-14 | End: 2021-12-15

## 2021-12-14 RX ORDER — IBUPROFEN 200 MG
600 TABLET ORAL EVERY 6 HOURS
Refills: 0 | Status: DISCONTINUED | OUTPATIENT
Start: 2021-12-14 | End: 2021-12-16

## 2021-12-14 RX ORDER — VALACYCLOVIR 500 MG/1
500 TABLET, FILM COATED ORAL EVERY 12 HOURS
Refills: 0 | Status: DISCONTINUED | OUTPATIENT
Start: 2021-12-14 | End: 2021-12-14

## 2021-12-14 RX ORDER — ACYCLOVIR 50 MG/G
1 OINTMENT TOPICAL
Refills: 0 | Status: DISCONTINUED | OUTPATIENT
Start: 2021-12-14 | End: 2021-12-16

## 2021-12-14 RX ADMIN — Medication 1 TABLET(S): at 11:38

## 2021-12-14 RX ADMIN — Medication 600 MILLIGRAM(S): at 05:20

## 2021-12-14 RX ADMIN — Medication 600 MILLIGRAM(S): at 11:38

## 2021-12-14 RX ADMIN — Medication 200 MILLIGRAM(S): at 21:09

## 2021-12-14 RX ADMIN — VALACYCLOVIR 2000 MILLIGRAM(S): 500 TABLET, FILM COATED ORAL at 17:43

## 2021-12-14 RX ADMIN — Medication 975 MILLIGRAM(S): at 14:33

## 2021-12-14 RX ADMIN — SODIUM CHLORIDE 3 MILLILITER(S): 9 INJECTION INTRAMUSCULAR; INTRAVENOUS; SUBCUTANEOUS at 15:52

## 2021-12-14 RX ADMIN — SODIUM CHLORIDE 3 MILLILITER(S): 9 INJECTION INTRAMUSCULAR; INTRAVENOUS; SUBCUTANEOUS at 21:06

## 2021-12-14 RX ADMIN — Medication 975 MILLIGRAM(S): at 02:09

## 2021-12-14 RX ADMIN — SODIUM CHLORIDE 3 MILLILITER(S): 9 INJECTION INTRAMUSCULAR; INTRAVENOUS; SUBCUTANEOUS at 05:19

## 2021-12-14 RX ADMIN — ACYCLOVIR 1 APPLICATION(S): 50 OINTMENT TOPICAL at 17:45

## 2021-12-14 RX ADMIN — Medication 975 MILLIGRAM(S): at 08:26

## 2021-12-14 RX ADMIN — Medication 975 MILLIGRAM(S): at 21:09

## 2021-12-14 RX ADMIN — Medication 200 MILLIGRAM(S): at 05:20

## 2021-12-14 RX ADMIN — Medication 600 MILLIGRAM(S): at 23:58

## 2021-12-14 RX ADMIN — Medication 600 MILLIGRAM(S): at 17:44

## 2021-12-14 RX ADMIN — Medication 200 MILLIGRAM(S): at 14:22

## 2021-12-14 NOTE — DISCHARGE NOTE OB - CARE PROVIDER_[facility] CALL
Obstetrics and Gynecology, [place] [address] [place] [address]  Phone: [phone]  Fax: [phone]  Established Patient  Follow Up Time: 2 weeks

## 2021-12-14 NOTE — DISCHARGE NOTE OB - PROVIDER TOKENS
FREE:[LAST:[Obstetrics and Gynecology],FIRST:[[facility]],PHONE:[phone]],FAX:[phone]],ADDRESS:[[address] [place] [address] [place] [address]],FOLLOWUP:[2 weeks],ESTABLISHEDPATIENT:[T]]

## 2021-12-14 NOTE — DISCHARGE NOTE OB - HOSPITAL COURSE
Patient induced in early term pregnancy for preeclmapsia with severe features, she delivered via spontaneous vaginal delivery, patient was continued on magnesium 24 hours after delivery. She was transferred to postpartum unit without complications, started on maintenance antihypertensive medication. Upon discharge she is voiding, tolerating PO, ambulating, and pain is controlled.

## 2021-12-14 NOTE — PROGRESS NOTE ADULT - ATTENDING COMMENTS
Post  day # 1  preeclampsia with severe features on mag and po labetalol 200 tid  cannot exclude hsv oral outbreak  no complaints  exam wnl    Plan  cbc, mag level  complete seizure prophylaxis  counseled regarding hsv rx and avoidance of oral contact with baby  other routine care  discussed contraception, vaccination, breastfeeding

## 2021-12-14 NOTE — DISCHARGE NOTE OB - NS MD DC FALL RISK RISK
For information on Fall & Injury Prevention, visit: [email or web address] OR  [email or web address] OR  https://www.cdc.gov/steadi/patient.html

## 2021-12-14 NOTE — PROGRESS NOTE ADULT - SUBJECTIVE AND OBJECTIVE BOX
JANE CARBAJAL is a 36y  now PPD#1 s/p spontaneous vaginal delivery at 37.5 weeks gestation, uncomplicated.    S:    No acute events overnight.   The patient has no complaints.  Pain controlled with current treatment regimen.   She is ambulating without difficulty and tolerating PO.   + flatus/-BM/+ voiding   She endorses appropriate lochia, which is decreasing.   She is breastfeeding without difficulty.   She denies fevers, chills, nausea and vomiting.   She denies lightheadedness, dizziness, palpitations, chest pain and SOB.     O:    T(C): 36.6 (21 @ 02:05), Max: 37 (21 @ 00:01)  HR: 74 (21 @ 02:05) (60 - 98)  BP: 148/78 (21 @ 02:05) (97/54 - 162/87)  RR: 16 (21 @ 02:05) (14 - 18)  SpO2: 97% (21 @ 02:05) (94% - 100%)    Gen: NAD, AOx3  CV: RRR, S1/S2 present  Pulm: CTAB  Breast: Nontender, non-engorged   Abdomen:  Soft, non-tender, non-distended, +bowel sounds  Uterus:  Fundus firm below umbilicus  VE:  Expectant lochia  Ext:  Non-tender and non-edematous                          11.2   8.77  )-----------( 117      ( 13 Dec 2021 08:42 )             33.3         137  |  105  |  14.9  ----------------------------<  104<H>  4.4   |  19.0<L>  |  0.91    Ca    8.2<L>      13 Dec 2021 08:42  Mg     4.9         TPro  6.6  /  Alb  3.3  /  TBili  0.2<L>  /  DBili  x   /  AST  37<H>  /  ALT  58<H>  /  AlkPhos  240<H>

## 2021-12-14 NOTE — DISCHARGE NOTE OB - PATIENT PORTAL LINK FT
You can access the FollowMyHealth Patient Portal offered by [facility] by registering at the following website: [email or web address]. By joining "[employer]’s FollowMyHealth portal, you will also be able to view your health information using other applications (apps) compatible with our system.

## 2021-12-14 NOTE — PROGRESS NOTE ADULT - ASSESSMENT
A/P:  36y  now PPD#1 s/p spontaneous vaginal delivery at 37.5 weeks gestation, uncomplicated.  -Vital signs stable, on Mg for PECwSF, BPs reassuring  -Hgb: 11.5 -> AM labs pending   -Voiding, tolerating PO, bowel function nml   -Advance care as tolerated   -Continue routine postpartum care and education  -Healthy male infant, declines circumcision  -Dispo: Patient to be discharged when meeting all postpartum and postoperative milestones and pending attending approval.

## 2021-12-14 NOTE — DISCHARGE NOTE OB - CARE PLAN
1 Principal Discharge DX:	Vaginal delivery  Assessment and plan of treatment:	Patient should transition to regular activity level. Resume regular diet. Patient should follow up with her OB for postpartum checkup in 2-3 weeks. Patient should call her doctor sooner if she develops fever or uncontrolled vaginal bleeding. Take medications as directed. Exclusive breast feeding for the first 6 months is recommended. Nothing per vagina for 6 weeks (incl. sex, douching, etc). If you have additional concerns, please inform your provider.  Secondary Diagnosis:	Severe preeclampsia, third trimester  Assessment and plan of treatment:	1) Take your blood pressure medications as prescribed at home  2) Before taking your medication, take your blood pressure. If your systolic (top number) blood pressure is less than 110 or your heartrate is less than 60, skip that dose  3) Make a follow up appointment with your doctor within a week for a blood pressure check  4) Come back to labor and delivery if you experience a headache that is not relieved with pain medication, changes in your vision, severe abdominal pain in the upper right part of the belly  5)Patient should also follow up with Cardio OB program for preeclampsia management and blood pressure control. Cardio - OB team will contact the patient for the appointment to follow up.   Principal Discharge DX:	Vaginal delivery  Assessment and plan of treatment:	Patient should transition to regular activity level. Resume regular diet. Patient should follow up with her OB for postpartum checkup in 2-3 weeks. Patient should call her doctor sooner if she develops fever or uncontrolled vaginal bleeding. Take medications as directed. Exclusive breast feeding for the first 6 months is recommended. Nothing per vagina for 6 weeks (incl. sex, douching, etc). If you have additional concerns, please inform your provider.  Secondary Diagnosis:	Severe preeclampsia, third trimester  Assessment and plan of treatment:	1) Take your blood pressure medications as prescribed at home  2) Before taking your medication, take your blood pressure. If your systolic (top number) blood pressure is less than 110 or your heartrate is less than 60, skip that dose  3) Make a follow up appointment with your doctor within a week for a blood pressure check  4) Come back to labor and delivery if you experience a headache that is not relieved with pain medication, changes in your vision, severe abdominal pain in the upper right part of the belly, or have elevated BPs with a systolic above 160 and/or diastolic above 110.  5)Patient should also follow up with Cardio OB program for preeclampsia management and blood pressure control. Cardio - OB team will contact the patient for the appointment to follow up.

## 2021-12-14 NOTE — DISCHARGE NOTE OB - MEDICATION SUMMARY - MEDICATIONS TO TAKE
I will START or STAY ON the medications listed below when I get home from the hospital:    ibuprofen 600 mg oral tablet  -- 1 tab(s) by mouth every 6 hours  -- Indication: For pain     labetalol 300 mg oral tablet  -- 1 tab(s) by mouth every 8 hours   -- Indication: For blood pressure

## 2021-12-14 NOTE — DISCHARGE NOTE OB - PLAN OF CARE
Patient should transition to regular activity level. Resume regular diet. Patient should follow up with her OB for postpartum checkup in 2-3 weeks. Patient should call her doctor sooner if she develops fever or uncontrolled vaginal bleeding. Take medications as directed. Exclusive breast feeding for the first 6 months is recommended. Nothing per vagina for 6 weeks (incl. sex, douching, etc). If you have additional concerns, please inform your provider. 1) Take your blood pressure medications as prescribed at home  2) Before taking your medication, take your blood pressure. If your systolic (top number) blood pressure is less than 110 or your heartrate is less than 60, skip that dose  3) Make a follow up appointment with your doctor within a week for a blood pressure check  4) Come back to labor and delivery if you experience a headache that is not relieved with pain medication, changes in your vision, severe abdominal pain in the upper right part of the belly  5)Patient should also follow up with Cardio OB program for preeclampsia management and blood pressure control. Cardio - OB team will contact the patient for the appointment to follow up. 1) Take your blood pressure medications as prescribed at home  2) Before taking your medication, take your blood pressure. If your systolic (top number) blood pressure is less than 110 or your heartrate is less than 60, skip that dose  3) Make a follow up appointment with your doctor within a week for a blood pressure check  4) Come back to labor and delivery if you experience a headache that is not relieved with pain medication, changes in your vision, severe abdominal pain in the upper right part of the belly, or have elevated BPs with a systolic above 160 and/or diastolic above 110.  5)Patient should also follow up with Cardio OB program for preeclampsia management and blood pressure control. Cardio - OB team will contact the patient for the appointment to follow up.

## 2021-12-15 LAB
COVID-19 SPIKE DOMAIN AB INTERP: POSITIVE
COVID-19 SPIKE DOMAIN ANTIBODY RESULT: >250 U/ML — HIGH
MAGNESIUM SERPL-MCNC: 2.7 MG/DL — HIGH (ref 1.8–2.6)
MAGNESIUM SERPL-MCNC: 2.9 MG/DL — HIGH (ref 1.6–2.6)
MAGNESIUM SERPL-MCNC: 3.3 MG/DL — HIGH (ref 1.6–2.6)
SARS-COV-2 IGG+IGM SERPL QL IA: >250 U/ML — HIGH
SARS-COV-2 IGG+IGM SERPL QL IA: POSITIVE

## 2021-12-15 RX ORDER — SIMETHICONE 80 MG/1
80 TABLET, CHEWABLE ORAL ONCE
Refills: 0 | Status: COMPLETED | OUTPATIENT
Start: 2021-12-15 | End: 2021-12-15

## 2021-12-15 RX ORDER — BENZOCAINE AND MENTHOL 5; 1 G/100ML; G/100ML
1 LIQUID ORAL ONCE
Refills: 0 | Status: COMPLETED | OUTPATIENT
Start: 2021-12-15 | End: 2021-12-15

## 2021-12-15 RX ORDER — FAMOTIDINE 10 MG/ML
40 INJECTION INTRAVENOUS
Refills: 0 | Status: DISCONTINUED | OUTPATIENT
Start: 2021-12-15 | End: 2021-12-16

## 2021-12-15 RX ADMIN — FAMOTIDINE 40 MILLIGRAM(S): 10 INJECTION INTRAVENOUS at 10:30

## 2021-12-15 RX ADMIN — Medication 975 MILLIGRAM(S): at 09:11

## 2021-12-15 RX ADMIN — VALACYCLOVIR 2000 MILLIGRAM(S): 500 TABLET, FILM COATED ORAL at 06:10

## 2021-12-15 RX ADMIN — Medication 200 MILLIGRAM(S): at 06:10

## 2021-12-15 RX ADMIN — BENZOCAINE AND MENTHOL 1 LOZENGE: 5; 1 LIQUID ORAL at 17:03

## 2021-12-15 RX ADMIN — ACYCLOVIR 1 APPLICATION(S): 50 OINTMENT TOPICAL at 06:09

## 2021-12-15 RX ADMIN — Medication 600 MILLIGRAM(S): at 05:10

## 2021-12-15 RX ADMIN — Medication 975 MILLIGRAM(S): at 14:39

## 2021-12-15 RX ADMIN — SODIUM CHLORIDE 3 MILLILITER(S): 9 INJECTION INTRAMUSCULAR; INTRAVENOUS; SUBCUTANEOUS at 06:13

## 2021-12-15 RX ADMIN — Medication 200 MILLIGRAM(S): at 21:57

## 2021-12-15 RX ADMIN — Medication 600 MILLIGRAM(S): at 11:27

## 2021-12-15 RX ADMIN — SIMETHICONE 80 MILLIGRAM(S): 80 TABLET, CHEWABLE ORAL at 06:11

## 2021-12-15 RX ADMIN — FAMOTIDINE 40 MILLIGRAM(S): 10 INJECTION INTRAVENOUS at 17:03

## 2021-12-15 RX ADMIN — Medication 975 MILLIGRAM(S): at 21:02

## 2021-12-15 RX ADMIN — SODIUM CHLORIDE 3 MILLILITER(S): 9 INJECTION INTRAMUSCULAR; INTRAVENOUS; SUBCUTANEOUS at 14:40

## 2021-12-15 RX ADMIN — Medication 1 TABLET(S): at 11:26

## 2021-12-15 RX ADMIN — Medication 200 MILLIGRAM(S): at 14:39

## 2021-12-15 RX ADMIN — Medication 600 MILLIGRAM(S): at 17:02

## 2021-12-15 RX ADMIN — Medication 975 MILLIGRAM(S): at 21:32

## 2021-12-15 RX ADMIN — ACYCLOVIR 1 APPLICATION(S): 50 OINTMENT TOPICAL at 17:04

## 2021-12-15 NOTE — PROGRESS NOTE ADULT - ASSESSMENT
A/P:  36y  now PPD#1 s/p spontaneous vaginal delivery at 37.5 weeks gestation, uncomplicated.  -Vital signs stable, on Mg for PECwSF, BPs reassuring  -Hgb: 11.5 -> 10.4  -herpetic lesion on lower lip, started on valtrex PO  -Voiding, tolerating PO, bowel function nml   -Advance care as tolerated   -Continue routine postpartum care and education  -Healthy male infant, declines circumcision  -Dispo: Anticipate discharge today pending attending approval. A/P:  36y  now PPD#1 s/p spontaneous vaginal delivery at 37.5 weeks gestation, uncomplicated.  -Vital signs stable, on Mg for PECwSF, BPs reassuring  -Hgb: 11.5 -> 10.4  -herpetic lesion on lower lip, started on valtrex PO  -Voiding, tolerating PO, bowel function nml. Simethicone for gas pain and pepcid for acid reflux ordered  -Advance care as tolerated   -Continue routine postpartum care and education  -Healthy male infant, declines circumcision  -Dispo: Anticipate discharge today pending attending approval.

## 2021-12-15 NOTE — PROGRESS NOTE ADULT - SUBJECTIVE AND OBJECTIVE BOX
S: Patient doing well. Minimal lochia. No complaints.     O: Vital Signs Last 24 Hrs  T(C): 36.8 (15 Dec 2021 05:30), Max: 37 (14 Dec 2021 16:12)  T(F): 98.2 (15 Dec 2021 05:30), Max: 98.6 (14 Dec 2021 16:12)  HR: 62 (15 Dec 2021 05:30) (62 - 87)  BP: 148/76 (15 Dec 2021 05:30) (109/71 - 148/76)  BP(mean): --  RR: 18 (15 Dec 2021 05:30) (18 - 19)  SpO2: 98% (15 Dec 2021 05:30) (96% - 99%)    Gen: NAD  Breast : breast feeding  Abd: soft, NT, ND, fundus firm below umbilicus  Voiding well  Ext: no tenderness    Labs:                        10.4   x     )-----------( x        ( 14 Dec 2021 08:14 )             30.9            PP # 2 s/p     Doing well.  Discharge home.  Nothing in vagina and no heavy lifting for 6 weeks.   Follow up 6 weeks for post partum visit.  Call office for any fevers, chills, heavy vaginal bleeding, symptoms of depression, or any other concerning symptoms.  Continue motrin 600 mg every 6 hours.

## 2021-12-15 NOTE — PROGRESS NOTE ADULT - SUBJECTIVE AND OBJECTIVE BOX
JANE CARBAJAL is a 36y  now PPD#2 s/p spontaneous vaginal delivery at 37.5 weeks gestation, uncomplicated.    S:    No acute events overnight.   The patient has no complaints.  Pain controlled with current treatment regimen.   She is ambulating without difficulty and tolerating PO.   + flatus/-BM/+ voiding   She endorses appropriate lochia, which is decreasing.   She is breastfeeding without difficulty.   She denies fevers, chills, nausea and vomiting.   She denies lightheadedness, dizziness, palpitations, chest pain and SOB.     O:    T(C): 36.6 (21 @ 02:05), Max: 37 (21 @ 00:01)  HR: 74 (21 @ 02:05) (60 - 98)  BP: 148/78 (21 @ 02:05) (97/54 - 162/87)  RR: 16 (21 @ 02:05) (14 - 18)  SpO2: 97% (21 @ 02:05) (94% - 100%)    Gen: NAD, AOx3  CV: RRR, S1/S2 present  Pulm: CTAB  Breast: Nontender, non-engorged   Abdomen:  Soft, non-tender, non-distended, +bowel sounds  Uterus:  Fundus firm below umbilicus  VE:  Expectant lochia  Ext:  Non-tender and non-edematous                          11.2   8.77  )-----------( 117      ( 13 Dec 2021 08:42 )             33.3         137  |  105  |  14.9  ----------------------------<  104<H>  4.4   |  19.0<L>  |  0.91    Ca    8.2<L>      13 Dec 2021 08:42  Mg     4.9         TPro  6.6  /  Alb  3.3  /  TBili  0.2<L>  /  DBili  x   /  AST  37<H>  /  ALT  58<H>  /  AlkPhos  240<H>           JANE CARBAJAL is a 36y  now PPD#2 s/p spontaneous vaginal delivery at 37.5 weeks gestation, uncomplicated.    S:    No acute events overnight.   The patient complains of some gas pain and acid reflux.  Pain controlled with current treatment regimen.   She is ambulating without difficulty and tolerating PO.   + flatus/-BM/+ voiding   She endorses appropriate lochia, which is decreasing.   She is breastfeeding without difficulty.   She denies fevers, chills, nausea and vomiting.   She denies lightheadedness, dizziness, palpitations, chest pain and SOB.     O:    T(C): 36.6 (21 @ 02:05), Max: 37 (21 @ 00:01)  HR: 74 (21 @ 02:05) (60 - 98)  BP: 148/78 (21 @ 02:05) (97/54 - 162/87)  RR: 16 (21 @ 02:05) (14 - 18)  SpO2: 97% (21 @ 02:05) (94% - 100%)    Gen: NAD, AOx3  CV: RRR, S1/S2 present  Pulm: CTAB  Breast: Nontender, non-engorged   Abdomen:  Soft, non-tender, non-distended, +bowel sounds  Uterus:  Fundus firm below umbilicus  VE:  Expectant lochia  Ext:  Non-tender and non-edematous                          11.2   8.77  )-----------( 117      ( 13 Dec 2021 08:42 )             33.3         137  |  105  |  14.9  ----------------------------<  104<H>  4.4   |  19.0<L>  |  0.91    Ca    8.2<L>      13 Dec 2021 08:42  Mg     4.9         TPro  6.6  /  Alb  3.3  /  TBili  0.2<L>  /  DBili  x   /  AST  37<H>  /  ALT  58<H>  /  AlkPhos  240<H>

## 2021-12-16 ENCOUNTER — APPOINTMENT (OUTPATIENT)
Dept: ANTEPARTUM | Facility: CLINIC | Age: 36
End: 2021-12-16

## 2021-12-16 VITALS
OXYGEN SATURATION: 97 % | DIASTOLIC BLOOD PRESSURE: 78 MMHG | TEMPERATURE: 99 F | RESPIRATION RATE: 18 BRPM | HEART RATE: 68 BPM | SYSTOLIC BLOOD PRESSURE: 149 MMHG

## 2021-12-16 PROCEDURE — 84550 ASSAY OF BLOOD/URIC ACID: CPT

## 2021-12-16 PROCEDURE — 82570 ASSAY OF URINE CREATININE: CPT

## 2021-12-16 PROCEDURE — 85025 COMPLETE CBC W/AUTO DIFF WBC: CPT

## 2021-12-16 PROCEDURE — 86850 RBC ANTIBODY SCREEN: CPT

## 2021-12-16 PROCEDURE — 85610 PROTHROMBIN TIME: CPT

## 2021-12-16 PROCEDURE — 85018 HEMOGLOBIN: CPT

## 2021-12-16 PROCEDURE — 86901 BLOOD TYPING SEROLOGIC RH(D): CPT

## 2021-12-16 PROCEDURE — 86765 RUBEOLA ANTIBODY: CPT

## 2021-12-16 PROCEDURE — 86769 SARS-COV-2 COVID-19 ANTIBODY: CPT

## 2021-12-16 PROCEDURE — 36415 COLL VENOUS BLD VENIPUNCTURE: CPT

## 2021-12-16 PROCEDURE — 84156 ASSAY OF PROTEIN URINE: CPT

## 2021-12-16 PROCEDURE — 81001 URINALYSIS AUTO W/SCOPE: CPT

## 2021-12-16 PROCEDURE — 87635 SARS-COV-2 COVID-19 AMP PRB: CPT

## 2021-12-16 PROCEDURE — 83735 ASSAY OF MAGNESIUM: CPT

## 2021-12-16 PROCEDURE — 85730 THROMBOPLASTIN TIME PARTIAL: CPT

## 2021-12-16 PROCEDURE — 88307 TISSUE EXAM BY PATHOLOGIST: CPT

## 2021-12-16 PROCEDURE — 86780 TREPONEMA PALLIDUM: CPT

## 2021-12-16 PROCEDURE — 85027 COMPLETE CBC AUTOMATED: CPT

## 2021-12-16 PROCEDURE — 85014 HEMATOCRIT: CPT

## 2021-12-16 PROCEDURE — 85384 FIBRINOGEN ACTIVITY: CPT

## 2021-12-16 PROCEDURE — 80053 COMPREHEN METABOLIC PANEL: CPT

## 2021-12-16 PROCEDURE — 86695 HERPES SIMPLEX TYPE 1 TEST: CPT

## 2021-12-16 PROCEDURE — 85461 HEMOGLOBIN FETAL: CPT

## 2021-12-16 PROCEDURE — 83615 LACTATE (LD) (LDH) ENZYME: CPT

## 2021-12-16 PROCEDURE — 86900 BLOOD TYPING SEROLOGIC ABO: CPT

## 2021-12-16 PROCEDURE — 86696 HERPES SIMPLEX TYPE 2 TEST: CPT

## 2021-12-16 RX ORDER — LABETALOL HCL 100 MG
1 TABLET ORAL
Qty: 60 | Refills: 0
Start: 2021-12-16 | End: 2022-01-04

## 2021-12-16 RX ORDER — IBUPROFEN 200 MG
1 TABLET ORAL
Qty: 28 | Refills: 0
Start: 2021-12-16 | End: 2021-12-22

## 2021-12-16 RX ORDER — LABETALOL HCL 100 MG
300 TABLET ORAL EVERY 8 HOURS
Refills: 0 | Status: DISCONTINUED | OUTPATIENT
Start: 2021-12-16 | End: 2021-12-16

## 2021-12-16 RX ADMIN — ACYCLOVIR 1 APPLICATION(S): 50 OINTMENT TOPICAL at 05:57

## 2021-12-16 RX ADMIN — Medication 600 MILLIGRAM(S): at 00:45

## 2021-12-16 RX ADMIN — FAMOTIDINE 40 MILLIGRAM(S): 10 INJECTION INTRAVENOUS at 05:50

## 2021-12-16 RX ADMIN — Medication 600 MILLIGRAM(S): at 06:24

## 2021-12-16 RX ADMIN — Medication 300 MILLIGRAM(S): at 13:49

## 2021-12-16 RX ADMIN — Medication 600 MILLIGRAM(S): at 11:35

## 2021-12-16 RX ADMIN — Medication 975 MILLIGRAM(S): at 09:08

## 2021-12-16 RX ADMIN — Medication 600 MILLIGRAM(S): at 05:49

## 2021-12-16 RX ADMIN — Medication 300 MILLIGRAM(S): at 05:50

## 2021-12-16 RX ADMIN — Medication 1 TABLET(S): at 13:49

## 2021-12-16 RX ADMIN — Medication 600 MILLIGRAM(S): at 00:01

## 2021-12-16 NOTE — PROGRESS NOTE ADULT - SUBJECTIVE AND OBJECTIVE BOX
JANE CARBAJAL is a 36y  now PPD#3 s/p spontaneous vaginal delivery at 37.5 weeks gestation, uncomplicated.    S:    No acute events overnight.   She has some abdominal pain controlled with current treatment regimen.   She is ambulating without difficulty and tolerating PO.   + flatus/-BM/+ voiding   She endorses appropriate lochia, which is decreasing.   She is breastfeeding without difficulty.  She is experiencing some HA which is controlled with medication.      O:    Vital Signs Last 24 Hrs  T(C): 37.1 (16 Dec 2021 04:45), Max: 37.1 (15 Dec 2021 13:00)  T(F): 98.8 (16 Dec 2021 04:45), Max: 98.8 (16 Dec 2021 04:45)  HR: 60 (16 Dec 2021 04:45) (60 - 82)  BP: 148/72 (16 Dec 2021 04:45) (100/60 - 149/77)  BP(mean): --  RR: 18 (16 Dec 2021 04:45) (18 - 18)  SpO2: 95% (16 Dec 2021 04:45) (95% - 100%)    Gen: NAD, AOx3  CV: RRR, S1/S2 present  Pulm: CTAB  Breast: Nontender, non-engorged   Abdomen:  Soft, non-tender, non-distended, +bowel sounds  Uterus:  Fundus firm below umbilicus  VE:  Expectant lochia  Ext:  Non-tender and non-edematous                          10.4   x     )-----------( x        ( 14 Dec 2021 08:14 )             30.9

## 2021-12-16 NOTE — PROGRESS NOTE ADULT - ASSESSMENT
A/P:  36y  now PPD#3 s/p spontaneous vaginal delivery at 37.5 weeks gestation, uncomplicated.  -Vital signs stable, on Mg for PECwSF, BPs reassuring  -Hgb: 11.5 -> 10.4  -herpetic lesion on lower lip, started on valtrex PO  -Voiding, tolerating PO, bowel function nml. Simethicone for gas pain and pepcid for acid reflux ordered  -Advance care as tolerated   -Continue routine postpartum care and education  -Healthy male infant, declines circumcision  -Dispo: Anticipate discharge today pending attending approval.

## 2021-12-17 ENCOUNTER — NON-APPOINTMENT (OUTPATIENT)
Age: 36
End: 2021-12-17

## 2021-12-17 DIAGNOSIS — O14.93 UNSPECIFIED PRE-ECLAMPSIA, THIRD TRIMESTER: ICD-10-CM

## 2021-12-17 RX ORDER — OMEPRAZOLE 20 MG/1
20 CAPSULE, DELAYED RELEASE ORAL
Refills: 0 | Status: DISCONTINUED | COMMUNITY
End: 2021-12-17

## 2021-12-17 RX ORDER — FAMOTIDINE 20 MG/1
20 TABLET, FILM COATED ORAL DAILY
Qty: 90 | Refills: 3 | Status: DISCONTINUED | COMMUNITY
Start: 2020-09-24 | End: 2021-12-17

## 2021-12-17 RX ORDER — GABAPENTIN 100 MG/1
100 CAPSULE ORAL
Refills: 0 | Status: DISCONTINUED | COMMUNITY
End: 2021-12-17

## 2021-12-19 ENCOUNTER — NON-APPOINTMENT (OUTPATIENT)
Age: 36
End: 2021-12-19

## 2021-12-22 PROBLEM — Z98.51 TUBAL LIGATION STATUS: Chronic | Status: ACTIVE | Noted: 2021-12-13

## 2021-12-28 ENCOUNTER — NON-APPOINTMENT (OUTPATIENT)
Age: 36
End: 2021-12-28

## 2021-12-29 LAB — SURGICAL PATHOLOGY STUDY: SIGNIFICANT CHANGE UP

## 2022-01-03 ENCOUNTER — NON-APPOINTMENT (OUTPATIENT)
Age: 37
End: 2022-01-03

## 2022-01-14 ENCOUNTER — NON-APPOINTMENT (OUTPATIENT)
Age: 37
End: 2022-01-14

## 2022-01-14 RX ORDER — LABETALOL HYDROCHLORIDE 300 MG/1
300 TABLET, FILM COATED ORAL EVERY 8 HOURS
Qty: 60 | Refills: 0 | Status: DISCONTINUED | COMMUNITY
Start: 2021-12-17 | End: 2022-01-14

## 2022-01-14 RX ORDER — NIFEDIPINE 30 MG/1
30 TABLET, FILM COATED, EXTENDED RELEASE ORAL DAILY
Refills: 0 | Status: DISCONTINUED | COMMUNITY
Start: 2021-12-19 | End: 2022-01-14

## 2022-02-03 ENCOUNTER — APPOINTMENT (OUTPATIENT)
Dept: CARDIOLOGY | Facility: CLINIC | Age: 37
End: 2022-02-03

## 2022-09-16 NOTE — ED PROVIDER NOTE - PSYCHIATRIC, MLM
Patient examined, record reviewed, agree with findings and recommendations as documented above.  
Pt tripped and fell pta landing on her left elbow, pt guarding her arm , +radial pulse, +pain to forearm, elbow , upper arm and shoulder , denies loc, denies neck or back pain
Alert and oriented to person, place, time/situation. normal mood and affect. no apparent risk to self or others.

## 2024-05-22 NOTE — OB PROVIDER H&P - NS_PRENATALRECORD_OBGYN_ALL_OB
-- DO NOT REPLY / DO NOT REPLY ALL --  -- This inbox is not monitored  -- Message is from Engagement Center Operations ([facility]) --    General Patient Message: Patients is other is calling to schedule a new patient visit, referred by Dr. Robles       Caller Information         Type Contact Phone/Fax    05/22/2024 03:01 PM CDT Phone (Incoming) Shirley Cordero (Mother) [phone]            Alternative phone number: none    Can a detailed message be left? Yes - Voicemail      Patient has been advised the message will be addressed within 2-3 business days.             Yes, Full Record

## 2024-12-26 NOTE — OB RN PATIENT PROFILE - NS_PRENATALSTART_OBGYN_ALL_OB
.Overall how are you feeling? good    Compliant with routine OB appointments? yes    Have you completed your 3rd trimester lab work? yes    Have you reviewed the contents of 3rd trimester folder from office? yes   Have you decided on a pediatrician? yes    If yes, who Dr Villatoro     If no, reviewed practices and transferred call to [phone] to set up appointment with pediatric office.   Questions on paperwork to go back to office? no   Questions on the baby birth certificate and photography forms? no      Send link for the Hospital Readiness Video via [employer]   
Left vm to rtc for a 3rd trimester call  
Started first trimester